# Patient Record
Sex: MALE | Race: WHITE | Employment: FULL TIME | URBAN - METROPOLITAN AREA
[De-identification: names, ages, dates, MRNs, and addresses within clinical notes are randomized per-mention and may not be internally consistent; named-entity substitution may affect disease eponyms.]

---

## 2019-12-23 ENCOUNTER — HOSPITAL ENCOUNTER (EMERGENCY)
Facility: CLINIC | Age: 49
Discharge: HOME | End: 2019-12-23
Attending: EMERGENCY MEDICINE | Admitting: EMERGENCY MEDICINE
Payer: COMMERCIAL

## 2019-12-23 ENCOUNTER — APPOINTMENT (OUTPATIENT)
Dept: CT IMAGING | Facility: CLINIC | Age: 49
End: 2019-12-23
Attending: EMERGENCY MEDICINE
Payer: COMMERCIAL

## 2019-12-23 VITALS
OXYGEN SATURATION: 100 % | HEART RATE: 67 BPM | WEIGHT: 170 LBS | BODY MASS INDEX: 23.8 KG/M2 | DIASTOLIC BLOOD PRESSURE: 77 MMHG | HEIGHT: 71 IN | TEMPERATURE: 98.6 F | SYSTOLIC BLOOD PRESSURE: 117 MMHG | RESPIRATION RATE: 18 BRPM

## 2019-12-23 DIAGNOSIS — N20.1 URETEROLITHIASIS: Primary | ICD-10-CM

## 2019-12-23 DIAGNOSIS — R10.32 LEFT LOWER QUADRANT ABDOMINAL PAIN: ICD-10-CM

## 2019-12-23 LAB
ALBUMIN UR-MCNC: NEGATIVE MG/DL
ANION GAP SERPL CALCULATED.3IONS-SCNC: 8 MMOL/L (ref 3–14)
APPEARANCE UR: CLEAR
BASOPHILS # BLD AUTO: 0.1 10E9/L (ref 0–0.2)
BASOPHILS NFR BLD AUTO: 0.8 %
BILIRUB UR QL STRIP: NEGATIVE
BUN SERPL-MCNC: 16 MG/DL (ref 7–30)
CALCIUM SERPL-MCNC: 9.5 MG/DL (ref 8.5–10.1)
CHLORIDE SERPL-SCNC: 105 MMOL/L (ref 94–109)
CO2 SERPL-SCNC: 26 MMOL/L (ref 20–32)
COLOR UR AUTO: ABNORMAL
CREAT SERPL-MCNC: 1.04 MG/DL (ref 0.66–1.25)
DIFFERENTIAL METHOD BLD: ABNORMAL
EOSINOPHIL # BLD AUTO: 0.1 10E9/L (ref 0–0.7)
EOSINOPHIL NFR BLD AUTO: 0.8 %
ERYTHROCYTE [DISTWIDTH] IN BLOOD BY AUTOMATED COUNT: 12.5 % (ref 10–15)
GFR SERPL CREATININE-BSD FRML MDRD: 83 ML/MIN/{1.73_M2}
GLUCOSE SERPL-MCNC: 98 MG/DL (ref 70–99)
GLUCOSE UR STRIP-MCNC: NEGATIVE MG/DL
HCT VFR BLD AUTO: 43 % (ref 40–53)
HGB BLD-MCNC: 13.9 G/DL (ref 13.3–17.7)
HGB UR QL STRIP: NEGATIVE
IMM GRANULOCYTES # BLD: 0.1 10E9/L (ref 0–0.4)
IMM GRANULOCYTES NFR BLD: 0.4 %
KETONES UR STRIP-MCNC: 10 MG/DL
LEUKOCYTE ESTERASE UR QL STRIP: NEGATIVE
LYMPHOCYTES # BLD AUTO: 1.7 10E9/L (ref 0.8–5.3)
LYMPHOCYTES NFR BLD AUTO: 13.6 %
MCH RBC QN AUTO: 28.7 PG (ref 26.5–33)
MCHC RBC AUTO-ENTMCNC: 32.3 G/DL (ref 31.5–36.5)
MCV RBC AUTO: 89 FL (ref 78–100)
MONOCYTES # BLD AUTO: 0.9 10E9/L (ref 0–1.3)
MONOCYTES NFR BLD AUTO: 7.1 %
MUCOUS THREADS #/AREA URNS LPF: PRESENT /LPF
NEUTROPHILS # BLD AUTO: 9.9 10E9/L (ref 1.6–8.3)
NEUTROPHILS NFR BLD AUTO: 77.3 %
NITRATE UR QL: NEGATIVE
NRBC # BLD AUTO: 0 10*3/UL
NRBC BLD AUTO-RTO: 0 /100
PH UR STRIP: 7 PH (ref 5–7)
PLATELET # BLD AUTO: 268 10E9/L (ref 150–450)
POTASSIUM SERPL-SCNC: 3.6 MMOL/L (ref 3.4–5.3)
RBC # BLD AUTO: 4.84 10E12/L (ref 4.4–5.9)
RBC #/AREA URNS AUTO: 0 /HPF (ref 0–2)
SODIUM SERPL-SCNC: 139 MMOL/L (ref 133–144)
SOURCE: ABNORMAL
SP GR UR STRIP: 1 (ref 1–1.03)
UROBILINOGEN UR STRIP-MCNC: NORMAL MG/DL (ref 0–2)
WBC # BLD AUTO: 12.8 10E9/L (ref 4–11)
WBC #/AREA URNS AUTO: 1 /HPF (ref 0–5)

## 2019-12-23 PROCEDURE — 74177 CT ABD & PELVIS W/CONTRAST: CPT

## 2019-12-23 PROCEDURE — 25000125 ZZHC RX 250: Performed by: EMERGENCY MEDICINE

## 2019-12-23 PROCEDURE — 25800030 ZZH RX IP 258 OP 636: Performed by: EMERGENCY MEDICINE

## 2019-12-23 PROCEDURE — 81001 URINALYSIS AUTO W/SCOPE: CPT | Performed by: EMERGENCY MEDICINE

## 2019-12-23 PROCEDURE — 80048 BASIC METABOLIC PNL TOTAL CA: CPT | Performed by: EMERGENCY MEDICINE

## 2019-12-23 PROCEDURE — 96360 HYDRATION IV INFUSION INIT: CPT | Mod: 59

## 2019-12-23 PROCEDURE — 25000128 H RX IP 250 OP 636: Performed by: EMERGENCY MEDICINE

## 2019-12-23 PROCEDURE — 99285 EMERGENCY DEPT VISIT HI MDM: CPT | Mod: 25

## 2019-12-23 PROCEDURE — 85025 COMPLETE CBC W/AUTO DIFF WBC: CPT | Performed by: EMERGENCY MEDICINE

## 2019-12-23 RX ORDER — SODIUM CHLORIDE 9 MG/ML
1000 INJECTION, SOLUTION INTRAVENOUS CONTINUOUS
Status: DISCONTINUED | OUTPATIENT
Start: 2019-12-23 | End: 2019-12-23 | Stop reason: HOSPADM

## 2019-12-23 RX ORDER — ONDANSETRON 4 MG/1
4 TABLET, ORALLY DISINTEGRATING ORAL EVERY 8 HOURS PRN
Qty: 10 TABLET | Refills: 0 | Status: SHIPPED | OUTPATIENT
Start: 2019-12-23

## 2019-12-23 RX ORDER — TAMSULOSIN HYDROCHLORIDE 0.4 MG/1
0.4 CAPSULE ORAL DAILY
Qty: 7 CAPSULE | Refills: 0 | Status: SHIPPED | OUTPATIENT
Start: 2019-12-23 | End: 2019-12-30

## 2019-12-23 RX ORDER — IBUPROFEN 600 MG/1
600 TABLET, FILM COATED ORAL EVERY 6 HOURS PRN
Qty: 40 TABLET | Refills: 0 | Status: SHIPPED | OUTPATIENT
Start: 2019-12-23 | End: 2020-01-02

## 2019-12-23 RX ORDER — IOPAMIDOL 755 MG/ML
500 INJECTION, SOLUTION INTRAVASCULAR ONCE
Status: COMPLETED | OUTPATIENT
Start: 2019-12-23 | End: 2019-12-23

## 2019-12-23 RX ORDER — HYDROCODONE BITARTRATE AND ACETAMINOPHEN 5; 325 MG/1; MG/1
1 TABLET ORAL EVERY 6 HOURS PRN
Qty: 8 TABLET | Refills: 0 | Status: SHIPPED | OUTPATIENT
Start: 2019-12-23 | End: 2019-12-25

## 2019-12-23 RX ADMIN — SODIUM CHLORIDE 61 ML: 9 INJECTION, SOLUTION INTRAVENOUS at 18:31

## 2019-12-23 RX ADMIN — IOPAMIDOL 85 ML: 755 INJECTION, SOLUTION INTRAVENOUS at 18:30

## 2019-12-23 RX ADMIN — SODIUM CHLORIDE 1000 ML: 9 INJECTION, SOLUTION INTRAVENOUS at 16:57

## 2019-12-23 ASSESSMENT — ENCOUNTER SYMPTOMS
VOMITING: 0
FLANK PAIN: 0
CONSTIPATION: 1
ABDOMINAL PAIN: 1
FEVER: 0
BLOOD IN STOOL: 0
NAUSEA: 0
COUGH: 0
DIARRHEA: 0

## 2019-12-23 ASSESSMENT — MIFFLIN-ST. JEOR: SCORE: 1653.24

## 2019-12-23 NOTE — ED AVS SNAPSHOT
[facility] Emergency Department  [address]  [facility] [address]  Phone:  [phone]  Fax:  [phone]                                    Raffy Mathias   MRN: [id number]    Department:  [facility] Emergency Department   Date of Visit:  12/23/2019           After Visit Summary Signature Page    I have received my discharge instructions, and my questions have been answered. I have discussed any challenges I see with this plan with the nurse or doctor.    ..........................................................................................................................................  Patient/Patient Representative Signature      ..........................................................................................................................................  Patient Representative Print Name and Relationship to Patient    ..................................................               ................................................  Date                                   Time    ..........................................................................................................................................  Reviewed by Signature/Title    ...................................................              ..............................................  Date                                               Time          22EPIC Rev 08/18

## 2019-12-23 NOTE — ED PROVIDER NOTES
"  History     Chief Complaint:  Abdominal Pain    HPI   Raffy Mathias is a 49 year old male who presents for evaluation of lower abdominal pain. For the last few months, the patient reports occasional spurts of similar pain, however it would resolve quickly without intervention, so he ignored it. Four days ago, the patient reports the same left lower quadrant abdominal pain occurred but it was much more severe and caused him to \"double over\" at work. He went home and rested which did  Help his pain. The next day, he reports the pain came back however it did not get better with rest, so he went to Urgent Care. There, he was clinically diagnosed with diverticulitis and started on Cipro and Flagyl. Since starting the medications, he reports the pain gradually improved and by yesterday morning, it was gone. Today, he reports the pain was not present upon waking up, however it came back suddenly this afternoon. Because it was gone for a day, he grew concerned so he came back to the ED. Here, he reports only the left lower abdominal pain. It is exacerbated by standing for long periods of time. he denies any known lumps, nausea, vomiting, fevers, flank pain, or recent cough/cold symptoms. He does note some constipation of late for which he has been treating successfully with Miralax.     Allergies:  No Known Allergies     Medications:    Cipro  Flagyl    Past Medical History:    Diverticulitis     Past Surgical History:    The patient does not have any pertinent past surgical history.    Family History:    No past pertinent family history.    Social History:  Marital Status:   [2]  Presents to the ED with wife.      Review of Systems   Constitutional: Negative for fever.   HENT: Negative for congestion.    Respiratory: Negative for cough.    Gastrointestinal: Positive for abdominal pain and constipation. Negative for blood in stool, diarrhea, nausea and vomiting.   Genitourinary: Negative for flank pain.   All " "other systems reviewed and are negative.        Physical Exam     Patient Vitals for the past 24 hrs:   BP Temp Temp src Pulse Resp SpO2 Height Weight   12/23/19 1633 129/75 98.6  F (37  C) Oral 74 18 100 % 1.803 m (5' 11\") 77.1 kg (170 lb)      Physical Exam  General: The patient is alert, in no respiratory distress.    HENT: Mucous membranes moist.    Cardiovascular: Regular rate and rhythm. Good pulses in all four extremities. Normal capillary refill and skin turgor.     Respiratory: Lungs are clear. No nasal flaring. No retractions. No wheezing, no crackles.    Gastrointestinal: Abdomen soft. No guarding, no rebound. No palpable hernias. Discrete left inguinal tenderness.     Musculoskeletal: No gross deformity.     Skin: No rashes or petechiae.     Neurologic: The patient is alert and oriented x3. GCS 15. No testable cranial nerve deficit. Follows commands with clear and appropriate speech. Gives appropriate answers. Good strength in all extremities. No gross neurologic deficit. Gross sensation intact. Pupils are round and reactive. No meningismus.     Lymphatic: No cervical adenopathy. No lower extremity swelling.    Psychiatric: The patient is non-tearful.    Emergency Department Course   Imaging:  Radiographic findings were communicated with the patient who voiced understanding of the findings.  CT Abdomen/Pelvis with IV contrast:   Pending    Laboratory:  CBC: WBC: 12.8 (H), HGB: 13.9, PLT: 268  BMP: All WNL (Creatinine: 1.04)    Procedures:  None    Interventions:  1657 NS 1L IV     Emergency Department Course:  Nursing notes and vitals reviewed.   1645: I performed an exam of the patient as documented above.      IV was inserted and blood was drawn for laboratory testing, results above. Medicine administered as documented above.     1740: I rechecked the patient and discussed the results of her workup thus far. At this time, his CT abdomen/pelvis is still pending.     1800: I signed the patient's care out " to my partner, Dr. Canales pending the CT scan. She will decide disposition based on the results.     I personally reviewed the laboratory  results with the Patient and answered all related questions prior to sign out. .    Impression & Plan      Medical Decision Making:  The patient reports left lower quadrant abdominal pain had been seen in urgent care and started on Cipro and Flagyl for presumed diverticulitis.  The patient said he is been doing quite well over the weekend but had an episode today that was more severe was told to come to the ER.  I did consider with the low inguinal pain about potential for a hernia that appeared more lateral than I would expect I cannot find an overt hernia on his exam.  The patient had no signs of diffuse abdominal tenderness to suggest perforation but I did consider diverticulitis or abscess.  Patient was sent for a CT labs were were checked he was on a Dr. Leggett for follow-up regarding that.  I do anticipate that the patient has no significant condition and will likely be able to go home.  Diagnosis:  1. Ureterolithiasis    2. Left lower quadrant abdominal pain          Disposition:  Signed out to Dr. Canales pending CT scan    Discharge Medications:  New Prescriptions    No medications on file     Scribe Disclosure:  I, Roxana Nahomy, am serving as a scribe on 12/23/2019 at 4:52 PM to personally document services performed by Bryan Field MD based on my observations and the provider's statements to me.      12/23/2019   [facility] EMERGENCY DEPARTMENT       Bryan Field MD  01/22/20 0035

## 2019-12-23 NOTE — ED TRIAGE NOTES
ABCs intact. Pt was seen at urgent care Friday and dx with diverticulitis. Pt was started on cipro and flagyl. Pt c/o increased pain and was told to go the ER if pain increased.

## 2019-12-24 NOTE — DISCHARGE INSTRUCTIONS
Stop antibiotics.  Your kidney stone will likely pass on its own. To help, take Flomax until it has passed. Strain your urine to look for stone.  Use Motrin for mild pain and Norco for moderate-severe pain.  Use Zofran as needed for nausea or vomiting.  Drink lots of water.  Follow up with urology or primary care.  Return to the ER with uncontrolled pain, fever >100.4F, decreased urine output, or any other new or concerning symptoms.

## 2019-12-24 NOTE — ED NOTES
Pt verbalizes understanding of discharge plan, follow up and s/s to return to ER. Pt discharged ambulating well

## 2019-12-24 NOTE — ED PROVIDER NOTES
This 49 year old healthy male patient was endorsed to me by Dr. Field pending CT abdomen/pelvis for worsening LLQ despite Cipro and Flagyl for presumed diverticulitis diagnosed at Urgent Care 4 days ago.     I have reviewed patient's vitals, labs, and notes which are remarkable for leukocytosis to 12.8.    Imaging:   Radiographic findings were communicated with the patient who voiced understanding of the findings.    CT Abdomen Pelvis w Contrast  1.  Mild left obstructive uropathy secondary to a 4 mm stone at the ureterovesical junction.  As read by Radiology.    Laboratory:  UA: Ketone 10, Mucous present, o/w Negative    ED Course:   1915: I rechecked and updated the patient    2012: I rechecked the patient.  Findings and plan explained to the patient. Patient discharged home with instructions regarding supportive care, medications, and reasons to return. The importance of close follow-up was reviewed.     Discussion:  Raffy's CT scan is significant for a 4 mm left UVJ stone which is almost certainly the cause of his pain.  Urinalysis reveals no evidence of infection despite a leukocytosis to 12.8 which I suspect is reactive.  I discussed these results with the patient and his wife as well as treatment going forward with Motrin for mild to moderate pain and Norco for more severe pain.  I will provide him with Zofran as needed for nausea and vomiting as he had this on initial day of symptoms when his pain was most severe.  We will also initiate Flomax and he will strain his urine.  I recommend he stop Cipro and Flagyl as there is no evidence of diverticulitis and antibiotics are not currently indicated.  I have recommended he follow-up with his primary care provider or urologist though we discussed a low threshold for return if he has uncontrolled pain or development of fever.  All of his and his wife's questions were answered and they verbalized understanding.  Amenable to discharge.       Swetha Canales,  MD  12/24/19 0144